# Patient Record
(demographics unavailable — no encounter records)

---

## 2025-07-03 NOTE — REASON FOR VISIT
[Subsequent Evaluation] : a subsequent evaluation for [Spouse] : spouse [FreeTextEntry2] : parotid nodule

## 2025-07-03 NOTE — HISTORY OF PRESENT ILLNESS
[de-identified] : 66 year female last seen in 2023 with history of parotid nodules presents for follow up.  MRI from 9/2/20 was stable. US head/neck 6/3/24 Has had left sided facial pain for the last week radiating down her jaw Denies swelling or redness  Denies difficulty swallowing, dyspnea, vocal changes, fevers/chills, recent infections